# Patient Record
Sex: MALE | Race: WHITE
[De-identification: names, ages, dates, MRNs, and addresses within clinical notes are randomized per-mention and may not be internally consistent; named-entity substitution may affect disease eponyms.]

---

## 2021-05-03 ENCOUNTER — HOSPITAL ENCOUNTER (OUTPATIENT)
Dept: HOSPITAL 79 - HEART 5 | Age: 81
End: 2021-05-03
Attending: INTERNAL MEDICINE
Payer: COMMERCIAL

## 2021-05-03 DIAGNOSIS — Z86.19: Primary | ICD-10-CM

## 2021-05-17 ENCOUNTER — HOSPITAL ENCOUNTER (OUTPATIENT)
Dept: HOSPITAL 79 - CT | Age: 81
End: 2021-05-17
Attending: INTERNAL MEDICINE
Payer: COMMERCIAL

## 2021-05-17 DIAGNOSIS — I70.90: ICD-10-CM

## 2021-05-17 DIAGNOSIS — J43.9: ICD-10-CM

## 2021-05-17 DIAGNOSIS — K59.00: ICD-10-CM

## 2021-05-17 DIAGNOSIS — R91.8: Primary | ICD-10-CM

## 2021-06-15 ENCOUNTER — HOSPITAL ENCOUNTER (OUTPATIENT)
Dept: HOSPITAL 79 - CT | Age: 81
End: 2021-06-15
Attending: INTERNAL MEDICINE
Payer: COMMERCIAL

## 2021-06-15 DIAGNOSIS — C34.31: Primary | ICD-10-CM

## 2021-06-15 DIAGNOSIS — Z79.891: ICD-10-CM

## 2021-06-15 DIAGNOSIS — Z79.899: ICD-10-CM

## 2021-06-15 DIAGNOSIS — Z79.82: ICD-10-CM

## 2021-06-15 DIAGNOSIS — F17.210: ICD-10-CM

## 2021-06-15 LAB
HGB BLD-MCNC: 12.1 GM/DL (ref 14–17.5)
RED BLOOD COUNT: 3.8 M/UL (ref 4.2–5.5)
WHITE BLOOD COUNT: 13.9 K/UL (ref 4.5–11)

## 2021-07-23 ENCOUNTER — HOSPITAL ENCOUNTER (EMERGENCY)
Dept: HOSPITAL 79 - ER1 | Age: 81
Discharge: LEFT BEFORE DISCHARGE | End: 2021-07-23
Payer: COMMERCIAL

## 2021-07-23 DIAGNOSIS — Z79.82: ICD-10-CM

## 2021-07-23 DIAGNOSIS — J18.9: ICD-10-CM

## 2021-07-23 DIAGNOSIS — E22.2: ICD-10-CM

## 2021-07-23 DIAGNOSIS — J44.0: ICD-10-CM

## 2021-07-23 DIAGNOSIS — D72.829: ICD-10-CM

## 2021-07-23 DIAGNOSIS — R64: ICD-10-CM

## 2021-07-23 DIAGNOSIS — I25.10: ICD-10-CM

## 2021-07-23 DIAGNOSIS — Z20.822: ICD-10-CM

## 2021-07-23 DIAGNOSIS — Z95.5: ICD-10-CM

## 2021-07-23 DIAGNOSIS — C34.90: ICD-10-CM

## 2021-07-23 DIAGNOSIS — E87.2: ICD-10-CM

## 2021-07-23 DIAGNOSIS — Z79.899: ICD-10-CM

## 2021-07-23 DIAGNOSIS — A41.9: Primary | ICD-10-CM

## 2021-07-23 DIAGNOSIS — Z82.49: ICD-10-CM

## 2021-07-23 LAB
BUN/CREATININE RATIO: 26 (ref 0–10)
HGB BLD-MCNC: 10.3 GM/DL (ref 14–17.5)
RED BLOOD COUNT: 3.06 M/UL (ref 4.2–5.5)
WHITE BLOOD COUNT: 31.2 K/UL (ref 4.5–11)

## 2021-09-22 ENCOUNTER — HOSPITAL ENCOUNTER (EMERGENCY)
Dept: HOSPITAL 79 - ER1 | Age: 81
Discharge: LEFT BEFORE DISCHARGE | End: 2021-09-22
Payer: COMMERCIAL

## 2021-09-22 DIAGNOSIS — J40: Primary | ICD-10-CM

## 2021-09-22 DIAGNOSIS — Z87.891: ICD-10-CM

## 2021-09-22 DIAGNOSIS — Z20.822: ICD-10-CM

## 2021-09-22 LAB
BUN/CREATININE RATIO: 28 (ref 0–10)
HGB BLD-MCNC: 10.9 GM/DL (ref 14–17.5)
RED BLOOD COUNT: 3.33 M/UL (ref 4.2–5.5)
WHITE BLOOD COUNT: 16.3 K/UL (ref 4.5–11)

## 2021-09-22 PROCEDURE — U0002 COVID-19 LAB TEST NON-CDC: HCPCS

## 2021-09-27 ENCOUNTER — HOSPITAL ENCOUNTER (INPATIENT)
Dept: HOSPITAL 79 - ER1 | Age: 81
LOS: 8 days | DRG: 871 | End: 2021-10-05
Attending: INTERNAL MEDICINE | Admitting: INTERNAL MEDICINE
Payer: COMMERCIAL

## 2021-09-27 VITALS — HEIGHT: 66 IN | WEIGHT: 100 LBS | BODY MASS INDEX: 16.07 KG/M2

## 2021-09-27 DIAGNOSIS — J44.0: ICD-10-CM

## 2021-09-27 DIAGNOSIS — Z87.891: ICD-10-CM

## 2021-09-27 DIAGNOSIS — Z79.82: ICD-10-CM

## 2021-09-27 DIAGNOSIS — Z95.5: ICD-10-CM

## 2021-09-27 DIAGNOSIS — Z20.822: ICD-10-CM

## 2021-09-27 DIAGNOSIS — I10: ICD-10-CM

## 2021-09-27 DIAGNOSIS — Z82.49: ICD-10-CM

## 2021-09-27 DIAGNOSIS — J18.9: ICD-10-CM

## 2021-09-27 DIAGNOSIS — D53.9: ICD-10-CM

## 2021-09-27 DIAGNOSIS — J98.11: ICD-10-CM

## 2021-09-27 DIAGNOSIS — F10.10: ICD-10-CM

## 2021-09-27 DIAGNOSIS — J96.22: ICD-10-CM

## 2021-09-27 DIAGNOSIS — I25.10: ICD-10-CM

## 2021-09-27 DIAGNOSIS — J15.6: ICD-10-CM

## 2021-09-27 DIAGNOSIS — A41.9: Primary | ICD-10-CM

## 2021-09-27 DIAGNOSIS — J44.1: ICD-10-CM

## 2021-09-27 DIAGNOSIS — C34.90: ICD-10-CM

## 2021-09-27 DIAGNOSIS — J96.21: ICD-10-CM

## 2021-09-27 DIAGNOSIS — Z79.52: ICD-10-CM

## 2021-09-27 LAB
BUN/CREATININE RATIO: 23 (ref 0–10)
HGB BLD-MCNC: 10.3 GM/DL (ref 14–17.5)
RED BLOOD COUNT: 3.16 M/UL (ref 4.2–5.5)
WHITE BLOOD COUNT: 27 K/UL (ref 4.5–11)

## 2021-09-27 PROCEDURE — 5A09457 ASSISTANCE WITH RESPIRATORY VENTILATION, 24-96 CONSECUTIVE HOURS, CONTINUOUS POSITIVE AIRWAY PRESSURE: ICD-10-PCS | Performed by: INTERNAL MEDICINE

## 2021-09-27 PROCEDURE — U0002 COVID-19 LAB TEST NON-CDC: HCPCS

## 2021-09-28 LAB
BUN/CREATININE RATIO: 23 (ref 0–10)
HGB BLD-MCNC: 8.8 GM/DL (ref 14–17.5)
RED BLOOD COUNT: 2.71 M/UL (ref 4.2–5.5)
WHITE BLOOD COUNT: 20.3 K/UL (ref 4.5–11)

## 2021-09-29 LAB
BUN/CREATININE RATIO: 25 (ref 0–10)
HGB BLD-MCNC: 9.5 GM/DL (ref 14–17.5)
RED BLOOD COUNT: 2.97 M/UL (ref 4.2–5.5)
WHITE BLOOD COUNT: 27 K/UL (ref 4.5–11)

## 2021-09-30 LAB
BUN/CREATININE RATIO: 26 (ref 0–10)
HGB BLD-MCNC: 9.2 GM/DL (ref 14–17.5)
RED BLOOD COUNT: 2.85 M/UL (ref 4.2–5.5)
WHITE BLOOD COUNT: 27.7 K/UL (ref 4.5–11)

## 2021-10-01 LAB
BUN/CREATININE RATIO: 32 (ref 0–10)
HGB BLD-MCNC: 9.4 GM/DL (ref 14–17.5)
RED BLOOD COUNT: 2.91 M/UL (ref 4.2–5.5)
WHITE BLOOD COUNT: 28.4 K/UL (ref 4.5–11)

## 2021-10-02 LAB — BUN/CREATININE RATIO: 29 (ref 0–10)

## 2021-10-03 LAB
BUN/CREATININE RATIO: 34 (ref 0–10)
HGB BLD-MCNC: 9.6 GM/DL (ref 14–17.5)
RED BLOOD COUNT: 3.01 M/UL (ref 4.2–5.5)
WHITE BLOOD COUNT: 23.6 K/UL (ref 4.5–11)

## 2021-10-04 LAB
BUN/CREATININE RATIO: 36 (ref 0–10)
HGB BLD-MCNC: 9.2 GM/DL (ref 14–17.5)
RED BLOOD COUNT: 2.96 M/UL (ref 4.2–5.5)
WHITE BLOOD COUNT: 27 K/UL (ref 4.5–11)

## 2021-10-04 NOTE — NUR
LAST NIGHT PATIENT DESATTED INTO THE 70S WHILE ASLEEP, PATIENT WAS THEN PUT ON
A NONREBREATHER AT 15L. PATIENTS SATS RETURNED TO NORMAL BUT PATIENT
INTERMITTANTLY TOOK NONREBREATHER OFF, SO HE KEPT HIS NASAL CANNULA TO WEAR
PRN. PATIENT EDUCATED BUT CONTINUES TO WEAR BOTH AT TIMES. MD AWARE.

## 2021-10-05 LAB
BUN/CREATININE RATIO: 33 (ref 0–10)
HGB BLD-MCNC: 9.7 GM/DL (ref 14–17.5)
RED BLOOD COUNT: 3.01 M/UL (ref 4.2–5.5)
WHITE BLOOD COUNT: 32.5 K/UL (ref 4.5–11)